# Patient Record
Sex: FEMALE | Race: WHITE | NOT HISPANIC OR LATINO | URBAN - METROPOLITAN AREA
[De-identification: names, ages, dates, MRNs, and addresses within clinical notes are randomized per-mention and may not be internally consistent; named-entity substitution may affect disease eponyms.]

---

## 2017-01-01 ENCOUNTER — HOME CARE/HOSPICE - HEALTHEAST (OUTPATIENT)
Dept: HOSPICE | Facility: HOSPICE | Age: 82
End: 2017-01-01

## 2017-01-01 ENCOUNTER — AMBULATORY - HEALTHEAST (OUTPATIENT)
Dept: HOSPICE | Facility: HOSPICE | Age: 82
End: 2017-01-01

## 2017-01-01 ENCOUNTER — OFFICE VISIT - HEALTHEAST (OUTPATIENT)
Dept: GERIATRICS | Facility: CLINIC | Age: 82
End: 2017-01-01

## 2017-01-01 DIAGNOSIS — M19.90 DJD (DEGENERATIVE JOINT DISEASE): ICD-10-CM

## 2017-01-01 DIAGNOSIS — R52 PAIN MANAGEMENT: ICD-10-CM

## 2017-01-01 DIAGNOSIS — R63.4 WEIGHT LOSS: ICD-10-CM

## 2017-01-01 DIAGNOSIS — I10 ESSENTIAL HYPERTENSION: ICD-10-CM

## 2017-01-01 DIAGNOSIS — I10 ESSENTIAL HYPERTENSION WITH GOAL BLOOD PRESSURE LESS THAN 130/85: ICD-10-CM

## 2017-01-01 DIAGNOSIS — F03.90 DEMENTIA (H): ICD-10-CM

## 2017-01-01 DIAGNOSIS — T14.8XXA WOUND OF SKIN: ICD-10-CM

## 2017-01-01 DIAGNOSIS — L89.94: ICD-10-CM

## 2017-01-01 DIAGNOSIS — D64.9 ANEMIA: ICD-10-CM

## 2017-01-01 DIAGNOSIS — D64.9 ANEMIA, UNSPECIFIED TYPE: ICD-10-CM

## 2017-01-01 DIAGNOSIS — K21.9 GERD (GASTROESOPHAGEAL REFLUX DISEASE): ICD-10-CM

## 2017-01-01 RX ORDER — BISACODYL 10 MG
10 SUPPOSITORY, RECTAL RECTAL DAILY PRN
Status: SHIPPED | COMMUNITY
Start: 2017-01-01

## 2017-01-01 RX ORDER — METHADONE HYDROCHLORIDE 5 MG/1
5 TABLET ORAL EVERY 12 HOURS
Status: SHIPPED | COMMUNITY
Start: 2017-01-01

## 2017-01-01 RX ORDER — ATROPINE SULFATE 10 MG/ML
1-2 SOLUTION/ DROPS OPHTHALMIC EVERY 4 HOURS PRN
Status: SHIPPED | COMMUNITY
Start: 2017-01-01

## 2017-01-01 RX ORDER — MORPHINE SULFATE 100 MG/5ML
10 SOLUTION ORAL
Status: SHIPPED | COMMUNITY
Start: 2017-01-01

## 2017-01-01 RX ORDER — LORAZEPAM 2 MG/ML
1 CONCENTRATE ORAL EVERY 4 HOURS
Status: SHIPPED | COMMUNITY
Start: 2017-01-01

## 2017-01-01 RX ORDER — MENTHOL AND ZINC OXIDE .44; 20.625 G/100G; G/100G
1 OINTMENT TOPICAL 3 TIMES DAILY PRN
Status: SHIPPED | COMMUNITY
Start: 2017-01-01

## 2017-01-01 RX ORDER — LORAZEPAM 2 MG/ML
1 CONCENTRATE ORAL
Status: SHIPPED | COMMUNITY
Start: 2017-01-01

## 2017-01-01 RX ORDER — HALOPERIDOL 2 MG/ML
0.5-1 SOLUTION ORAL EVERY 4 HOURS PRN
Status: SHIPPED | COMMUNITY
Start: 2017-01-01

## 2017-01-01 RX ORDER — SODIUM HYPOCHLORITE 2.5 MG/ML
1 SOLUTION TOPICAL DAILY
Status: SHIPPED | COMMUNITY
Start: 2017-01-01

## 2017-07-31 ENCOUNTER — AMBULATORY - HEALTHEAST (OUTPATIENT)
Dept: GERIATRICS | Facility: CLINIC | Age: 82
End: 2017-07-31

## 2021-05-30 VITALS — WEIGHT: 138.6 LBS

## 2021-05-30 VITALS — WEIGHT: 135.2 LBS

## 2021-05-30 VITALS — WEIGHT: 138 LBS

## 2021-05-30 VITALS — WEIGHT: 138.2 LBS

## 2021-05-30 VITALS — WEIGHT: 135 LBS

## 2021-05-31 VITALS — WEIGHT: 120.8 LBS

## 2021-05-31 VITALS — WEIGHT: 122 LBS

## 2021-05-31 VITALS — WEIGHT: 128.1 LBS

## 2021-06-01 ENCOUNTER — RECORDS - HEALTHEAST (OUTPATIENT)
Dept: ADMINISTRATIVE | Facility: CLINIC | Age: 86
End: 2021-06-01

## 2021-06-08 NOTE — PROGRESS NOTES
[facility]    Facility:   [facility]  [[id number]]   Code Status: DNR/DNI       Chief Complaint   Patient presents with     Review Of Multiple Medical Conditions     [facility] 1/31/17.       HPI:  Ly Pastor is an 89 year old female seen for routine physician follow up in the long term care facility. She has been a resident here since Jan 2015. She has dementia, generally quite forgetful and confused but is generally cooperative with nursing staff. She takes iron, some supplements and bowel meds. She does have hx of HTN but no treatment currently.  Notes indicate she was not able to bear weight using mechanical stand. In Sept did have PT eval. She has had gradual weight loss and decrease intakes. She is on iron for hx of anemia. No recent cough cold or congestion. She denies headache, nausea or vomiting, no chest pain or shortness of breath. Her knees bother her at times. She does not ambulate. She is on scheduled tylenol and Tramadol with good relief. She enjoys going to activities.     She is working with therapy today, trying to find a solution for stiffness of her knees and contractures. She holds her knees tight together. The therapist is trying different supports and pads.      Past Medical History:  Past Medical History:   Diagnosis Date     Colon cancer      Dementia      DJD (degenerative joint disease)      DJD (degenerative joint disease)      Edema      GERD (gastroesophageal reflux disease)      HTN (hypertension)      Meniere's disease      Mild cognitive impairment      Osteoporosis      Recurrent falls        Medications;  Current Outpatient Prescriptions   Medication Sig     acetaminophen (TYLENOL) 500 MG tablet Take 1,000 mg by mouth 4 (four) times a day. Give 2 tablets by mouth 2 times daily for pain. Max APAP: 4,000mg/24hrs.     calcium carbonate-vit D3-min 600 mg calcium- 400 unit Tab Take 1 tablet by mouth 2 (two) times a day.     dimethicone 5 % Crea Apply 1  application topically 2 (two) times a day. Apply to perineal area for skin breakdown     ferrous gluconate (FERGON) 324 MG tablet Take 324 mg by mouth daily with breakfast. Do not chew or crush.     guaiFENesin (ROBITUSSIN) 100 mg/5 mL syrup Take 5 mL by mouth every 8 (eight) hours as needed for cough.     Lactobacillus rhamnosus GG (CULTURELLE) 10-15 Billion cell capsule Take 1 capsule by mouth daily.     lidocaine (XYLOCAINE) 2 % jelly Apply topically 2 (two) times a day.     nystatin (MYCOSTATIN) ointment Apply 1 application topically 2 (two) times a day as needed. For red areas, to red skin folds and under breasts until healed     omeprazole (PRILOSEC) 20 MG capsule Take 20 mg by mouth daily. Swallow whole, do not chew or crush.     traMADol (ULTRAM) 50 mg tablet Take 50 mg by mouth 4 (four) times a day. Alternate with tylenol.        Physical Exam:   General: Patient is alert, elderly female, no distress.  Vitals: /70, Temp 96.4, Pulse 74, RR 16, O2 sat 96% RA.  HEENT: Eyes show no injection or icterus. Nares negative. Oropharynx well hydrated.  Neck: Supple, no JVD.  Lungs: Clear bilaterally.  Cardiovascular: Regular rate and rhythm.  Abdomen: Obese, soft, non tender.  Extremities: No significant edema.  Musculoskeletal: Age related degenerative changes.      Labs:  4/7/15: B12 229, Vitamin D 29.2.  7/28/15: TSH 3.02.  12/31/15: wbc 8.4, hgb 12.5, plts 323, Na 139, K 4.2, BUN 21, Cr 0.91, GFR 59.      Assessment/Plan:  1. Dementia. No disruptive behaviors.  2. DJD. Stable on current scheduled Tramadol and tylenol.  3. Anemia. On iron.   4. HTN. No meds. BPs acceptable.     Overall she appears stable with noted weight loss.       Electronically signed by: Rose Meza MD

## 2021-06-09 NOTE — PROGRESS NOTES
"     [facility]    DATE:  2017    NAME:  Ly Pastor             :  1926  MRN: [id number]  CODE STATUS:  DNR/DNI    VISIT TYPE: REVIEW OF MULTIPLE CHRONIC MEDICAL CONDITIONS  FACILITY:  [facility] [[id number]]   ROOM: 419            CHIEF COMPLAIN/REASON FOR VISIT:   Chief Complaint   Patient presents with     Review Of Multiple Medical Conditions     HISTORY OF PRESENT ILLNESS: Ly Pastor is a 89 y.o. female is being seen in the long term care facility for follow up and management of multiple chronic medial conditions.  She has been a resident here since 2015. She has Dementia and is generally quite forgetful and confused but is generally cooperative with nursing staff.    Today, patient reports having pain but when asked where she can't identify location.  She goes on to say that \"everything hurt depending on what your are doing.\"  She is currently on scheduled Tylenol.  She does have a history of HTN but not currently on any medications.   She denies any headaches, chest pain, palpitations, or lightheadedness, or dizziness. She is not able to ambulate and is now in a new reclining wheelchair. She has had some physical decline since our last visit in December.  No recent cough cold or congestion. She denies headache, nausea or vomiting, no chest pain or shortness of breath. She enjoys going to activities on the unit.     Past Medical History:   Diagnosis Date     Colon cancer      Dementia      DJD (degenerative joint disease)      DJD (degenerative joint disease)      Edema      GERD (gastroesophageal reflux disease)      HTN (hypertension)      Meniere's disease      Mild cognitive impairment      Osteoporosis      Recurrent falls      Past Surgical History:   Procedure Laterality Date     APPENDECTOMY       COLON SURGERY      X 2 for colon cancer     HERNIA REPAIR       HYSTERECTOMY       TONSILLECTOMY       Social History     Social History     Marital " status:      Spouse name: N/A     Number of children: N/A     Years of education: N/A     Occupational History     Not on file.     Social History Main Topics     Smoking status: Never Smoker     Smokeless tobacco: Not on file     Alcohol use Yes      Comment: Occasional glass of wine , special occasions/holidays     Drug use: No     Sexual activity: No     Other Topics Concern     Not on file     Social History Narrative     No narrative on file       ALLERGY: No Known Allergies     Current Outpatient Prescriptions   Medication Sig     dimethicone 5 % Crea Apply 1 application topically 2 (two) times a day.     guaiFENesin (ROBITUSSIN) 100 mg/5 mL syrup Take 5 mL by mouth every 8 (eight) hours as needed for cough. Indications: Cough     Lactobacillus rhamnosus GG (CULTURELLE) 10-15 Billion cell capsule Take 1 capsule by mouth daily.     lidocaine (XYLOCAINE) 2 % jelly Apply 1 application topically 2 (two) times a day. And daily prn     magnesium hydroxide (MAGNESIUM HYDROXIDE) 400 mg/5 mL Susp suspension Take 30 mL by mouth daily as needed.     methadone (DOLOPHINE) 5 MG tablet Take 5 mg by mouth every 12 (twelve) hours. Indications: Severe Pain     morphine 20 mg/1 mL concentrated solution Take 5 mg by mouth every 2 (two) hours as needed (breakthru pain or shortness of  breath). Indications: breakthru pain or shortness of  breath     nystatin (MYCOSTATIN) ointment Apply 1 application topically 2 (two) times a day as needed. For red areas, to red skin folds and under breasts until healed     omeprazole (PRILOSEC) 20 MG capsule Take 20 mg by mouth daily. Swallow whole, do not chew or crush.     oseltamivir (TAMIFLU) 75 MG capsule Take 75 mg by mouth daily.     acetaminophen (TYLENOL) 500 MG tablet Take 1,000 mg by mouth see administration instructions. May give now while waiting for new medications to be delivered.  Indications: Pain         REVIEW OF SYSTEMS    Currently, no fever, chills, or rigors. She does  not have any visual or hearing problems. She denies any chest pain, headaches, palpitations, lightheadedness, dizziness, shortness of breath, or cough. Her appetite is good, he denies any GERD symptoms, she denies any difficulty with swallowing, nausea, or vomiting.  She denies any abdominal pain, diarrhea or constipation. She denies any urinary symptoms. No insomnia. No active bleeding. No rash.     PHYSICAL EXAMINATION:    Vitals:    02/23/17 2105   BP: 141/56   Pulse: 74   Resp: 16   Temp: (!) 96.4  F (35.8  C)   SpO2: 96%   Weight: 135 lb 3.2 oz (61.3 kg)     General: Patient is alert, elderly female, no distress.  HEENT: Eyes show no injection or icterus. Nares negative. Oropharynx well hydrated.  Neck: Supple, no JVD.  Lungs: Clear bilaterally.  Cardiovascular: Regular rate and rhythm.  Abdomen: Obese, soft, non tender.  Extremities: No significant edema.    LABS  Lab Results   Component Value Date    WBC 8.0 12/06/2016    HGB 13.2 12/06/2016    HCT 39.3 12/06/2016     (H) 12/06/2016     12/06/2016     Results for orders placed or performed in visit on 02/22/17   Basic Metabolic Panel   Result Value Ref Range    Sodium 140 136 - 145 mmol/L    Potassium 3.6 3.5 - 5.0 mmol/L    Chloride 107 98 - 107 mmol/L    CO2 25 22 - 31 mmol/L    Anion Gap, Calculation 8 5 - 18 mmol/L    Glucose 125 70 - 125 mg/dL    Calcium 9.0 8.5 - 10.5 mg/dL    BUN 24 8 - 28 mg/dL    Creatinine 0.86 0.60 - 1.10 mg/dL    GFR MDRD Af Amer >60 >60 mL/min/1.73m2    GFR MDRD Non Af Amer >60 >60 mL/min/1.73m2     Lab Results   Component Value Date    [id number] 229 04/07/2015     VITAMIN D, TOTAL (25-HYDROXY)   Date Value Ref Range Status   04/07/2015 29.2 (L) 30.0 - 80.0 ng/mL Final     Lab Results   Component Value Date    TSH 1.54 12/06/2016       ASSESSMENT/PLAN:    1. Pain management - Controlled on Morphine, Methadone, and Lidocaine Gel   2. DJD (degenerative joint disease) - See above   3. Essential hypertension  - Blood  pressures are within target range, no current meds.   4. Dementia -  Neurologically stable without any disruptive behaviors.   5. GERD (gastroesophageal reflux disease) - Stable on Omeprazole           CARE PLAN: The care plan has been reviewed and discussed with patient and nursing staff. No changes made at this time.  We will continue to monitor        Electronically signed by: Tg Rosario CNP

## 2021-06-10 NOTE — PROGRESS NOTES
[facility]    DATE:  2017    NAME:  Ly Pastor             :  1926  MRN: [id number]  CODE STATUS:  DNR/DNI    VISIT TYPE: REVIEW OF MULTIPLE CHRONIC MEDICAL CONDITIONS  FACILITY:  [facility] [[id number]]   ROOM: 419            CHIEF COMPLAIN/REASON FOR VISIT:   Chief Complaint   Patient presents with     Review Of Multiple Medical Conditions     HISTORY OF PRESENT ILLNESS: Ly Pastor is a 89 y.o. female is being seen in the long term care facility for follow up and management of multiple chronic medial conditions.  She has been a resident here since 2015. She has Dementia and is generally quite forgetful and confused but is generally cooperative with nursing staff.    Today, patient is seen resting comfortably in bed.  She is now on Hospice.  She has a Right Ischial Tuberosity Wound - Obscured full-thickness skin and tissue loss. Unstageable due to slough/escar.  She continues to decline with continued weight loss.  Pain controlled.    Past Medical History:   Diagnosis Date     Colon cancer      Dementia      DJD (degenerative joint disease)      DJD (degenerative joint disease)      Edema      GERD (gastroesophageal reflux disease)      HTN (hypertension)      Meniere's disease      Mild cognitive impairment      Osteoporosis      Recurrent falls      Past Surgical History:   Procedure Laterality Date     APPENDECTOMY       COLON SURGERY      X 2 for colon cancer     HERNIA REPAIR       HYSTERECTOMY       TONSILLECTOMY       Social History     Social History     Marital status:      Spouse name: N/A     Number of children: N/A     Years of education: N/A     Occupational History     Not on file.     Social History Main Topics     Smoking status: Never Smoker     Smokeless tobacco: Not on file     Alcohol use Yes      Comment: Occasional glass of wine , special occasions/holidays     Drug use: No     Sexual activity: No     Other Topics Concern      Not on file     Social History Narrative     No narrative on file       ALLERGY: No Known Allergies     Current Outpatient Prescriptions   Medication Sig     acetaminophen (TYLENOL EXTRA STRENGTH) 500 MG tablet Take 1,000 mg by mouth 3 (three) times a day. Indications: knee pain     bisacodyl (DULCOLAX, BISACODYL,) 10 mg suppository Insert 10 mg into the rectum every other day. Per hospice admission orders  Indications: Constipation     chlorhexidine (HIBICLENS) 4 % external liquid Apply 1 application topically Daily at 8:00 am. Irrigate wound with 1:1 hibiclens and NS daily and prn  Indications: wound treatment     dimethicone 5 % Crea Apply 1 application topically 2 (two) times a day.     guaiFENesin (ROBITUSSIN) 100 mg/5 mL syrup Take 5 mL by mouth every 8 (eight) hours as needed for cough. Indications: Cough     Lactobacillus rhamnosus GG (CULTURELLE) 10-15 Billion cell capsule Take 1 capsule by mouth daily.     lidocaine (XYLOCAINE) 2 % jelly Apply 1 application topically 2 (two) times a day. And daily prn     magnesium hydroxide (MAGNESIUM HYDROXIDE) 400 mg/5 mL Susp suspension Take 30 mL by mouth daily as needed.     methadone (DOLOPHINE) 5 MG tablet Take 5 mg by mouth every 12 (twelve) hours. Indications: Severe Pain     morphine 20 mg/1 mL concentrated solution Take 5 mg by mouth every 2 (two) hours as needed (breakthru pain or shortness of  breath).      morphine 20 mg/1 mL concentrated solution Take 5 mg by mouth 3 (three) times a day. Indications: Pain     nystatin (MYCOSTATIN) ointment Apply 1 application topically 2 (two) times a day as needed. For red areas, to red skin folds and under breasts until healed     omeprazole (PRILOSEC) 20 MG capsule Take 20 mg by mouth daily. Swallow whole, do not chew or crush.     sodium hypochlorite (DAKIN'S, HALF-STRENGTH,) external solution Apply 1 application topically daily.     REVIEW OF SYSTEMS    Currently, no fever, chills, or rigors. She does not have any  visual or hearing problems. She denies any chest pain, headaches, palpitations, lightheadedness, dizziness, shortness of breath, or cough. Her appetite is good, he denies any GERD symptoms, she denies any difficulty with swallowing, nausea, or vomiting.  She denies any abdominal pain, diarrhea or constipation. She denies any urinary symptoms. No insomnia. No active bleeding. No rash.     PHYSICAL EXAMINATION:    Vitals:    05/23/17 1118   BP: 114/59   Pulse: 83   Resp: 18   Temp: 98.7  F (37.1  C)   SpO2: 95%   Weight: 122 lb (55.3 kg)     General: Patient is alert, elderly female, no distress.  HEENT: Eyes show no injection or icterus. Nares negative. Oropharynx well hydrated.  Neck: Supple, no JVD.  Lungs: Clear bilaterally.  Cardiovascular: Regular rate and rhythm.  Abdomen: Obese, soft, non tender.  Extremities: No significant edema.    LABS  Lab Results   Component Value Date    WBC 8.0 12/06/2016    HGB 13.2 12/06/2016    HCT 39.3 12/06/2016     (H) 12/06/2016     12/06/2016     Results for orders placed or performed in visit on 02/22/17   Basic Metabolic Panel   Result Value Ref Range    Sodium 140 136 - 145 mmol/L    Potassium 3.6 3.5 - 5.0 mmol/L    Chloride 107 98 - 107 mmol/L    CO2 25 22 - 31 mmol/L    Anion Gap, Calculation 8 5 - 18 mmol/L    Glucose 125 70 - 125 mg/dL    Calcium 9.0 8.5 - 10.5 mg/dL    BUN 24 8 - 28 mg/dL    Creatinine 0.86 0.60 - 1.10 mg/dL    GFR MDRD Af Amer >60 >60 mL/min/1.73m2    GFR MDRD Non Af Amer >60 >60 mL/min/1.73m2     Lab Results   Component Value Date    [id number] 229 04/07/2015     Vitamin D, Total (25-Hydroxy)   Date Value Ref Range Status   04/07/2015 29.2 (L) 30.0 - 80.0 ng/mL Final     Lab Results   Component Value Date    TSH 1.54 12/06/2016       ASSESSMENT/PLAN:    1. Wound of skin - Right  Ischial Tuberosity,  Obscured full-thickness skin and tissue loss. Unstageable due to slough/escar.  Will continue current treatment orders.   2. D[facility] - [facility]   3. Pain management - Managed on Morphine and Methadone   4. Weight loss - Onging   5. Essential hypertension with goal blood pressure less than 130/85 -  Blood pressures are within target range, not on any current meds.             CARE PLAN: The care plan has been reviewed and discussed with patient and nursing staff. No changes made at this time.  We will continue to monitor        Electronically signed by: Tg Rosario CNP

## 2021-06-10 NOTE — PROGRESS NOTES
[facility]    Facility:   [facility] [[id number]]   Code Status: DNR/DNI       Chief Complaint   Patient presents with     Review Of Multiple Medical Conditions     [facility] 4/30/17.       HPI:  Ly Pastor is an 89 year old female seen for routine physician follow up in the long term care facility. She has been a resident here since Jan 2015. She has dementia, generally quite forgetful and confused but is generally cooperative with nursing staff. She does have hx of HTN but no treatment currently. Notes indicate she was not able to bear weight using mechanical stand. She does not ambulate. In Sept 2016 did have PT eval. She has had gradual weight loss and decrease intakes.     Due to weight loss and progressive decline, there was a Hospice consult and she signed on with [facility] as of 3/3/17. She is on scheduled pain meds tylenol, methadone and morphine. This seems to manage her pain well. She does have dementia. She is on nutritional supplements. Weight range stable in last few months but she is down 20 pounds since one year ago. She does appear thinner. She has not been ill recently with cough, cold or congestion. She had developed a pressure wound on right ischial tuberosity area, currently using medi honey. She denies headache, nausea or vomiting, no chest pain or shortness of breath. Her knees bother her at times.      Past Medical History:  Past Medical History:   Diagnosis Date     Colon cancer      Dementia      DJD (degenerative joint disease)      DJD (degenerative joint disease)      Edema      GERD (gastroesophageal reflux disease)      HTN (hypertension)      Meniere's disease      Mild cognitive impairment      Osteoporosis      Recurrent falls        Medications:  Current Outpatient Prescriptions   Medication Sig     acetaminophen (TYLENOL EXTRA STRENGTH) 500 MG tablet Take 1,000 mg by mouth 3 (three) times a day. Indications: knee pain     acetaminophen  (TYLENOL) 500 MG tablet Take 1,000 mg by mouth see administration instructions. May give now while waiting for new medications to be delivered.  Indications: Pain     bisacodyl (DULCOLAX, BISACODYL,) 10 mg suppository Insert 10 mg into the rectum every other day. Per hospice admission orders  Indications: Constipation     dimethicone 5 % Crea Apply 1 application topically 2 (two) times a day.     guaiFENesin (ROBITUSSIN) 100 mg/5 mL syrup Take 5 mL by mouth every 8 (eight) hours as needed for cough. Indications: Cough     Lactobacillus rhamnosus GG (CULTURELLE) 10-15 Billion cell capsule Take 1 capsule by mouth daily.     lidocaine (XYLOCAINE) 2 % jelly Apply 1 application topically 2 (two) times a day. And daily prn     magnesium hydroxide (MAGNESIUM HYDROXIDE) 400 mg/5 mL Susp suspension Take 30 mL by mouth daily as needed.     methadone (DOLOPHINE) 5 MG tablet Take 5 mg by mouth every 12 (twelve) hours. Indications: Severe Pain     morphine 20 mg/1 mL concentrated solution Take 5 mg by mouth every 2 (two) hours as needed (breakthru pain or shortness of  breath). Indications: breakthru pain or shortness of  breath     morphine 20 mg/1 mL concentrated solution Take 5 mg by mouth 3 (three) times a day. Indications: Pain     nystatin (MYCOSTATIN) ointment Apply 1 application topically 2 (two) times a day as needed. For red areas, to red skin folds and under breasts until healed     omeprazole (PRILOSEC) 20 MG capsule Take 20 mg by mouth daily. Swallow whole, do not chew or crush.       Physical Exam:   General: Patient is alert, elderly female, no distress. In gerichair.  Vitals: /70, Temp 98.5, Pulse 65, RR 18, O2 sat 98% RA.  HEENT: Eyes show no injection or icterus. Nares negative. Oropharynx well hydrated.  Neck: Supple, no JVD.  Lungs: Clear bilaterally.  Cardiovascular: Regular rate and rhythm.  Abdomen: Obese, soft, non tender.  Extremities: No significant edema.  Musculoskeletal: Age related  degenerative changes.  Skin: PU right IT, not visualized.      Labs:  4/7/15: B12 229, Vitamin D 29.2.  7/28/15: TSH 3.02.  12/31/15: wbc 8.4, hgb 12.5, plts 323, Na 139, K 4.2, BUN 21, Cr 0.91, GFR 59.      Assessment/Plan:  1. Dementia. No disruptive behaviors.  2. FTT/Weight loss. On supplements.  3. DJD. Managed with scheduled meds.  4. Right IT wound. Cont wound cares.  5. Anemia.    Continue care plan as written. She is appropriate for Hospice.      Electronically signed by: Rose Meza MD

## 2021-06-15 PROBLEM — L89.94: Status: ACTIVE | Noted: 2017-01-01
